# Patient Record
Sex: FEMALE | Race: WHITE | NOT HISPANIC OR LATINO | Employment: PART TIME | URBAN - METROPOLITAN AREA
[De-identification: names, ages, dates, MRNs, and addresses within clinical notes are randomized per-mention and may not be internally consistent; named-entity substitution may affect disease eponyms.]

---
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
## 2022-06-15 ENCOUNTER — HOSPITAL ENCOUNTER (EMERGENCY)
Facility: HOSPITAL | Age: 22
Discharge: HOME | End: 2022-06-16
Attending: EMERGENCY MEDICINE | Admitting: EMERGENCY MEDICINE
Payer: COMMERCIAL

## 2022-06-15 DIAGNOSIS — R07.89 OTHER CHEST PAIN: ICD-10-CM

## 2022-06-15 LAB
BASOPHILS # BLD AUTO: 0.1 10E3/UL (ref 0–0.2)
BASOPHILS NFR BLD AUTO: 1 %
EOSINOPHIL # BLD AUTO: 0.1 10E3/UL (ref 0–0.7)
EOSINOPHIL NFR BLD AUTO: 1 %
ERYTHROCYTE [DISTWIDTH] IN BLOOD BY AUTOMATED COUNT: 13.1 % (ref 10–15)
HCT VFR BLD AUTO: 37.6 % (ref 35–47)
HGB BLD-MCNC: 12 G/DL (ref 11.7–15.7)
IMM GRANULOCYTES # BLD: 0 10E3/UL
IMM GRANULOCYTES NFR BLD: 0 %
LYMPHOCYTES # BLD AUTO: 3.5 10E3/UL (ref 0.8–5.3)
LYMPHOCYTES NFR BLD AUTO: 36 %
MCH RBC QN AUTO: 26.4 PG (ref 26.5–33)
MCHC RBC AUTO-ENTMCNC: 31.9 G/DL (ref 31.5–36.5)
MCV RBC AUTO: 83 FL (ref 78–100)
MONOCYTES # BLD AUTO: 0.4 10E3/UL (ref 0–1.3)
MONOCYTES NFR BLD AUTO: 4 %
NEUTROPHILS # BLD AUTO: 5.8 10E3/UL (ref 1.6–8.3)
NEUTROPHILS NFR BLD AUTO: 58 %
NRBC # BLD AUTO: 0 10E3/UL
NRBC BLD AUTO-RTO: 0 /100
PLATELET # BLD AUTO: 264 10E3/UL (ref 150–450)
RBC # BLD AUTO: 4.54 10E6/UL (ref 3.8–5.2)
WBC # BLD AUTO: 9.9 10E3/UL (ref 4–11)

## 2022-06-15 PROCEDURE — 84484 ASSAY OF TROPONIN QUANT: CPT | Performed by: EMERGENCY MEDICINE

## 2022-06-15 PROCEDURE — 250N000013 HC RX MED GY IP 250 OP 250 PS 637: Performed by: EMERGENCY MEDICINE

## 2022-06-15 PROCEDURE — 99285 EMERGENCY DEPT VISIT HI MDM: CPT | Mod: 25

## 2022-06-15 PROCEDURE — 81025 URINE PREGNANCY TEST: CPT | Performed by: EMERGENCY MEDICINE

## 2022-06-15 PROCEDURE — 93005 ELECTROCARDIOGRAM TRACING: CPT | Performed by: EMERGENCY MEDICINE

## 2022-06-15 PROCEDURE — 80048 BASIC METABOLIC PNL TOTAL CA: CPT | Performed by: EMERGENCY MEDICINE

## 2022-06-15 PROCEDURE — 85025 COMPLETE CBC W/AUTO DIFF WBC: CPT | Performed by: EMERGENCY MEDICINE

## 2022-06-15 PROCEDURE — 36415 COLL VENOUS BLD VENIPUNCTURE: CPT | Performed by: EMERGENCY MEDICINE

## 2022-06-15 PROCEDURE — 85379 FIBRIN DEGRADATION QUANT: CPT | Performed by: EMERGENCY MEDICINE

## 2022-06-15 RX ORDER — IBUPROFEN 600 MG/1
600 TABLET, FILM COATED ORAL ONCE
Status: COMPLETED | OUTPATIENT
Start: 2022-06-15 | End: 2022-06-15

## 2022-06-15 RX ADMIN — IBUPROFEN 600 MG: 600 TABLET, FILM COATED ORAL at 23:34

## 2022-06-15 ASSESSMENT — ENCOUNTER SYMPTOMS
COUGH: 0
FEVER: 0
SHORTNESS OF BREATH: 0
CHILLS: 0

## 2022-06-16 ENCOUNTER — APPOINTMENT (OUTPATIENT)
Dept: RADIOLOGY | Facility: HOSPITAL | Age: 22
End: 2022-06-16
Attending: EMERGENCY MEDICINE
Payer: COMMERCIAL

## 2022-06-16 VITALS
HEIGHT: 62 IN | SYSTOLIC BLOOD PRESSURE: 116 MMHG | HEART RATE: 69 BPM | DIASTOLIC BLOOD PRESSURE: 57 MMHG | TEMPERATURE: 97.8 F | RESPIRATION RATE: 24 BRPM | WEIGHT: 143 LBS | BODY MASS INDEX: 26.31 KG/M2 | OXYGEN SATURATION: 99 %

## 2022-06-16 LAB
ANION GAP SERPL CALCULATED.3IONS-SCNC: 9 MMOL/L (ref 5–18)
BUN SERPL-MCNC: 8 MG/DL (ref 8–22)
CALCIUM SERPL-MCNC: 9.7 MG/DL (ref 8.5–10.5)
CHLORIDE BLD-SCNC: 108 MMOL/L (ref 98–107)
CO2 SERPL-SCNC: 24 MMOL/L (ref 22–31)
CREAT SERPL-MCNC: 0.69 MG/DL (ref 0.6–1.1)
D DIMER PPP FEU-MCNC: 0.38 UG/ML FEU (ref 0–0.5)
GFR SERPL CREATININE-BSD FRML MDRD: >90 ML/MIN/1.73M2
GLUCOSE BLD-MCNC: 144 MG/DL (ref 70–125)
HCG UR QL: NEGATIVE
Lab: NORMAL
POTASSIUM BLD-SCNC: 3.6 MMOL/L (ref 3.5–5)
SODIUM SERPL-SCNC: 141 MMOL/L (ref 136–145)
TROPONIN I SERPL-MCNC: 0.13 NG/ML (ref 0–0.29)

## 2022-06-16 PROCEDURE — 71045 X-RAY EXAM CHEST 1 VIEW: CPT

## 2022-06-16 NOTE — ED PROVIDER NOTES
EMERGENCY DEPARTMENT ENCOUNTER      NAME: Karime Aguilera  AGE: 22 year old female  YOB: 2000  MRN: [id number]  EVALUATION DATE & TIME: 6/15/2022 11:09 PM    PCP: Wendi Marin    ED PROVIDER: Leonarda Lagos M.D.      Chief Complaint   Patient presents with     Chest Pain         FINAL IMPRESSION:  1. Other chest pain        MEDICAL DECISION MAKING:  Pertinent Labs & Imaging studies reviewed. (See chart for details)  ED Course as of 06/16/22 0140   Wed [place] 15, 2022   2323 Afebrile.  Vital signs here unremarkable.  Patient is coming in for evaluation of chest pain.  Midsternal, radiates slightly to the right.  Worse with palpation.  Worse with deep breaths or laying down.  No fevers chills.  No risk factors for clotting.  She is low risk by [place] however she is not PERC negative as she does smoke.  No recent trips or travel.  No early cardiac history in family.  She has no significant medical history.  Reports menstrual cycle 2 weeks ago.    EKG here shows sinus rhythm with short VT at 108.  There is no ST elevations or depressions.  No ectopy.  Intervals are intact.  Normal axis.  No previous EKGs for comparison.  , QRS 84, .    Exam for patient here remarkable only for reproducible chest wall tenderness palpation just to the right of her sternum rib space 3 and 4.    Check labs for patient here, do a D-dimer as she does smoke, has mild risk factors.  Chest x-ray or CT as per D-dimer result.  EKG here is nonischemic.   Thu Jun 16, 2022   0043 Patient's work-up here is unremarkable.  Pain is better after ibuprofen.           Critical care: 0 minutes excluding separately billable procedures.  Includes bedside management, time reviewing test results, review of records, discussing the case with staff, documenting the medical record and time spent with family members (or surrogate decision makers) discussing specific treatment issues.      ED Course:  11:15 PM I met with the patient,  obtained history, performed an initial exam, and discussed options and plan for diagnostics and treatment here in the ED.     The importance of close follow up was discussed. We reviewed warning signs and symptoms, and I instructed Ms. Aguilera to return to the emergency department immediately if she develops any new or worsening symptoms. I provided additional verbal discharge instructions. Ms. Aguilera expressed understanding and agreement with this plan of care, her questions were answered, and she was discharged in stable condition.     MEDICATIONS GIVEN IN THE EMERGENCY:  Medications   ibuprofen (ADVIL/MOTRIN) tablet 600 mg (600 mg Oral Given 6/15/22 3761)       NEW PRESCRIPTIONS STARTED AT TODAY'S ER VISIT:  There are no discharge medications for this patient.         =================================================================    HPI    Patient information was obtained from: Patient    Use of : N/A         Karime Aguilera is a 22 year old female who presents with chest pain.    The patient reports chest pain that started earlier this evening.  She reports the pain initially was sternal, however the pain is now more right-sided and provoked by touching the area and deep breathing.  She reports the pain is sharp in nature.  She denies any recent long travel.  No birth control or blood thinner use.  She is a smoker.  She denies any falls or trauma.  She denies any fevers, chills, cough, shortness of breath, or other complaints at this time.       REVIEW OF SYSTEMS   Review of Systems   Constitutional: Negative for chills and fever.   Respiratory: Negative for cough and shortness of breath.    Cardiovascular: Positive for chest pain (sharp). Negative for leg swelling.   All other systems reviewed and are negative.    PAST MEDICAL HISTORY:  No past medical history on file.    PAST SURGICAL HISTORY:  No past surgical history on file.    CURRENT MEDICATIONS:    No current facility-administered medications for  "this encounter.  No current outpatient medications on file.    ALLERGIES:  No Known Allergies    FAMILY HISTORY:  No family history on file.    SOCIAL HISTORY:   Social History     Socioeconomic History     Marital status: Single       PHYSICAL EXAM:    Vitals: /57   Pulse 69   Temp 97.8  F (36.6  C) (Tympanic)   Resp 24   Ht 1.575 m (5' 2\")   Wt 64.9 kg (143 lb)   SpO2 99%   BMI 26.16 kg/m     General:. Alert and interactive, comfortable appearing.  HENT: Oropharynx without erythema or exudates. MMM.  TMs clear bilaterally.  Eyes: Pupils mid-sized and equally reactive.   Neck: Full AROM.  No midline tenderness to palpation.  Cardiovascular: Regular rate and rhythm. Peripheral pulses 2+ bilaterally.  Chest/Pulmonary: Normal work of breathing. Lung sounds clear and equal throughout, no wheezes or crackles. Reproducible chest wall tenderness palpation just to the right of her sternum rib space 3 and 4.  Abdomen: Soft, nondistended. Nontender without guarding or rebound.  Back/Spine: No CVA or midline tenderness.  Extremities: Normal ROM of all major joints. No lower extremity edema.   Skin: Warm and dry. Normal skin color.   Neuro: Speech clear. CNs grossly intact. Moves all extremities appropriately. Strength and sensation grossly intact to all extremities.   Psych: Normal affect/mood, cooperative, memory appropriate.     LAB:  All pertinent labs reviewed and interpreted.  Labs Ordered and Resulted from Time of ED Arrival to Time of ED Departure   BASIC METABOLIC PANEL - Abnormal       Result Value    Sodium 141      Potassium 3.6      Chloride 108 (*)     Carbon Dioxide (CO2) 24      Anion Gap 9      Urea Nitrogen 8      Creatinine 0.69      Calcium 9.7      Glucose 144 (*)     GFR Estimate >90     CBC WITH PLATELETS AND DIFFERENTIAL - Abnormal    WBC Count 9.9      RBC Count 4.54      Hemoglobin 12.0      Hematocrit 37.6      MCV 83      MCH 26.4 (*)     MCHC 31.9      RDW 13.1      Platelet Count 264  "     % Neutrophils 58      % Lymphocytes 36      % Monocytes 4      % Eosinophils 1      % Basophils 1      % Immature Granulocytes 0      NRBCs per 100 WBC 0      Absolute Neutrophils 5.8      Absolute Lymphocytes 3.5      Absolute Monocytes 0.4      Absolute Eosinophils 0.1      Absolute Basophils 0.1      Absolute Immature Granulocytes 0.0      Absolute NRBCs 0.0     TROPONIN I - Normal    Troponin I 0.13     D DIMER QUANTITATIVE - Normal    D-Dimer Quantitative 0.38     HCG QUALITATIVE URINE    hCG Urine Qualitative Negative      Exp Date hCG Urine           RADIOLOGY:  XR Chest Port 1 View   Final Result   IMPRESSION: Negative chest.          EKG:  See MDM    I, Suhail Jauregui, am serving as a scribe to document services personally performed by Dr. Leonarda Lagos  based on my observation and the provider's statements to me. I, Leonarda Lagos MD attest that Suhail Jauregui is acting in a scribe capacity, has observed my performance of the services and has documented them in accordance with my direction.      Leonarda Lagos M.D.  Emergency Medicine  [facility] EMERGENCY DEPARTMENT  [facility] [facility] [id number] [phone]  Dept: [phone]       Katt Lagos MD  06/16/22 0140

## 2022-06-16 NOTE — DISCHARGE INSTRUCTIONS
Thankfully your work-up here is unremarkable.  It is likely some irritation between your ribs called costochondritis.  You may take ibuprofen for this discomfort.  Please return for any new or worsening symptoms.

## 2022-06-16 NOTE — ED TRIAGE NOTES
Pt c/o chest pain that started this morning. C/o midsternal sharp chest pain, per pt it is painful with breathing. No N/V.   Triage Assessment     Row Name 06/15/22 7696       Triage Assessment (Adult)    Airway WDL WDL       Respiratory WDL    Respiratory WDL WDL       Skin Circulation/Temperature WDL    Skin Circulation/Temperature WDL WDL       Cognitive/Neuro/Behavioral WDL    Cognitive/Neuro/Behavioral WDL WDL

## 2022-06-19 LAB
ATRIAL RATE - MUSE: 62 BPM
DIASTOLIC BLOOD PRESSURE - MUSE: 60 MMHG
INTERPRETATION ECG - MUSE: NORMAL
P AXIS - MUSE: 39 DEGREES
PR INTERVAL - MUSE: 108 MS
QRS DURATION - MUSE: 84 MS
QT - MUSE: 400 MS
QTC - MUSE: 406 MS
R AXIS - MUSE: 79 DEGREES
SYSTOLIC BLOOD PRESSURE - MUSE: 122 MMHG
T AXIS - MUSE: 21 DEGREES
VENTRICULAR RATE- MUSE: 62 BPM

## 2024-07-28 ENCOUNTER — APPOINTMENT (OUTPATIENT)
Dept: CT IMAGING | Facility: HOSPITAL | Age: 24
End: 2024-07-28
Attending: EMERGENCY MEDICINE

## 2024-07-28 ENCOUNTER — HOSPITAL ENCOUNTER (EMERGENCY)
Facility: HOSPITAL | Age: 24
Discharge: HOME | End: 2024-07-28
Attending: EMERGENCY MEDICINE | Admitting: EMERGENCY MEDICINE

## 2024-07-28 ENCOUNTER — APPOINTMENT (OUTPATIENT)
Dept: RADIOLOGY | Facility: HOSPITAL | Age: 24
End: 2024-07-28
Attending: EMERGENCY MEDICINE

## 2024-07-28 VITALS
OXYGEN SATURATION: 100 % | HEART RATE: 75 BPM | WEIGHT: 152 LBS | TEMPERATURE: 97.7 F | RESPIRATION RATE: 16 BRPM | DIASTOLIC BLOOD PRESSURE: 76 MMHG | SYSTOLIC BLOOD PRESSURE: 139 MMHG | BODY MASS INDEX: 27.97 KG/M2 | HEIGHT: 62 IN
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
## 2024-07-28 DIAGNOSIS — M54.50 ACUTE MIDLINE LOW BACK PAIN WITHOUT SCIATICA: ICD-10-CM

## 2024-07-28 PROCEDURE — 99284 EMERGENCY DEPT VISIT MOD MDM: CPT | Mod: 25

## 2024-07-28 PROCEDURE — 70450 CT HEAD/BRAIN W/O DYE: CPT

## 2024-07-28 PROCEDURE — 70486 CT MAXILLOFACIAL W/O DYE: CPT

## 2024-07-28 PROCEDURE — 72100 X-RAY EXAM L-S SPINE 2/3 VWS: CPT

## 2024-07-28 PROCEDURE — 72125 CT NECK SPINE W/O DYE: CPT

## 2024-07-28 ASSESSMENT — COLUMBIA-SUICIDE SEVERITY RATING SCALE - C-SSRS
2. HAVE YOU ACTUALLY HAD ANY THOUGHTS OF KILLING YOURSELF IN THE PAST MONTH?: NO
1. IN THE PAST MONTH, HAVE YOU WISHED YOU WERE DEAD OR WISHED YOU COULD GO TO SLEEP AND NOT WAKE UP?: NO
6. HAVE YOU EVER DONE ANYTHING, STARTED TO DO ANYTHING, OR PREPARED TO DO ANYTHING TO END YOUR LIFE?: NO

## 2024-07-28 ASSESSMENT — ACTIVITIES OF DAILY LIVING (ADL)
ADLS_ACUITY_SCORE: 33
ADLS_ACUITY_SCORE: 33
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
## 2024-07-28 NOTE — DISCHARGE INSTRUCTIONS
Fortunately no signs of significant injuries were identified on your tests today.  Follow-up closely with your primary care doctor and return to the ER for any worsening symptoms or other concerns.

## 2025-06-20 ENCOUNTER — HOSPITAL ENCOUNTER (EMERGENCY)
Facility: HOSPITAL | Age: 25
Discharge: HOME | End: 2025-06-20
Attending: EMERGENCY MEDICINE | Admitting: EMERGENCY MEDICINE
Payer: COMMERCIAL

## 2025-06-20 VITALS
BODY MASS INDEX: 30.55 KG/M2 | WEIGHT: 166 LBS | HEART RATE: 70 BPM | HEIGHT: 62 IN | RESPIRATION RATE: 16 BRPM | OXYGEN SATURATION: 99 % | TEMPERATURE: 98.6 F | SYSTOLIC BLOOD PRESSURE: 140 MMHG | DIASTOLIC BLOOD PRESSURE: 94 MMHG

## 2025-06-20 DIAGNOSIS — N93.9 VAGINAL BLEEDING: ICD-10-CM

## 2025-06-20 LAB
ABO + RH BLD: NORMAL
APTT PPP: 24 SECONDS (ref 22–38)
BASOPHILS # BLD AUTO: 0 10E3/UL (ref 0–0.2)
BASOPHILS NFR BLD AUTO: 0 %
BLD GP AB SCN SERPL QL: NEGATIVE
EOSINOPHIL # BLD AUTO: 0 10E3/UL (ref 0–0.7)
EOSINOPHIL NFR BLD AUTO: 0 %
ERYTHROCYTE [DISTWIDTH] IN BLOOD BY AUTOMATED COUNT: 13.2 % (ref 10–15)
HCG SERPL QL: NEGATIVE
HCT VFR BLD AUTO: 42.2 % (ref 35–47)
HGB BLD-MCNC: 13.3 G/DL (ref 11.7–15.7)
HOLD SPECIMEN: NORMAL
IMM GRANULOCYTES # BLD: 0 10E3/UL
IMM GRANULOCYTES NFR BLD: 0 %
INR PPP: 0.89 (ref 0.85–1.15)
LYMPHOCYTES # BLD AUTO: 2.2 10E3/UL (ref 0.8–5.3)
LYMPHOCYTES NFR BLD AUTO: 26 %
MCH RBC QN AUTO: 25.8 PG (ref 26.5–33)
MCHC RBC AUTO-ENTMCNC: 31.5 G/DL (ref 31.5–36.5)
MCV RBC AUTO: 82 FL (ref 78–100)
MONOCYTES # BLD AUTO: 0.3 10E3/UL (ref 0–1.3)
MONOCYTES NFR BLD AUTO: 3 %
NEUTROPHILS # BLD AUTO: 5.9 10E3/UL (ref 1.6–8.3)
NEUTROPHILS NFR BLD AUTO: 70 %
NRBC # BLD AUTO: 0 10E3/UL
NRBC BLD AUTO-RTO: 0 /100
PLATELET # BLD AUTO: 258 10E3/UL (ref 150–450)
PROTHROMBIN TIME: 12.3 SECONDS (ref 11.8–14.8)
RBC # BLD AUTO: 5.15 10E6/UL (ref 3.8–5.2)
SPECIMEN EXP DATE BLD: NORMAL
WBC # BLD AUTO: 8.4 10E3/UL (ref 4–11)

## 2025-06-20 PROCEDURE — 85004 AUTOMATED DIFF WBC COUNT: CPT | Performed by: EMERGENCY MEDICINE

## 2025-06-20 PROCEDURE — 85610 PROTHROMBIN TIME: CPT | Performed by: EMERGENCY MEDICINE

## 2025-06-20 PROCEDURE — 86900 BLOOD TYPING SEROLOGIC ABO: CPT | Performed by: EMERGENCY MEDICINE

## 2025-06-20 PROCEDURE — 84703 CHORIONIC GONADOTROPIN ASSAY: CPT | Performed by: EMERGENCY MEDICINE

## 2025-06-20 PROCEDURE — 36415 COLL VENOUS BLD VENIPUNCTURE: CPT | Performed by: EMERGENCY MEDICINE

## 2025-06-20 PROCEDURE — 85730 THROMBOPLASTIN TIME PARTIAL: CPT | Performed by: EMERGENCY MEDICINE

## 2025-06-20 PROCEDURE — 99283 EMERGENCY DEPT VISIT LOW MDM: CPT | Performed by: EMERGENCY MEDICINE

## 2025-06-20 NOTE — ED PROVIDER NOTES
EMERGENCY DEPARTMENT ENCOUNTER     NAME: Karime Aguilera   AGE: 25 year old female   YOB: 2000   MRN: [id number]   EVALUATION DATE & TIME: No admission date for patient encounter.   PCP: Wendi Marin     Chief Complaint   Patient presents with    Vaginal Bleeding   :    FINAL IMPRESSION       1. Vaginal bleeding           ED COURSE & MEDICAL DECISION MAKING      Pertinent Labs & Imaging studies reviewed. (See chart for details)   25 year old female  presents to the Emergency Department for evaluation of ongoing vaginal bleeding.  Patient started new OCPs and then started spotting and has now been lightly bleeding with intermittent cramping and was worried about the continuation. Initial Vitals Reviewed. Initial exam notable for well-appearing patient with normal vitals, no abdominal tenderness.  She does not complain of any signs or symptoms of anemia, but I think her biggest concern was the duration of bleeding.  It sounds like prior to starting these OCPs her menstrual periods would last for about 10 days and I reviewed some OB records that show they think she has endometrial polyps and is scheduled for hysteroscopy.  The likely scenario is that this is just atypical vaginal bleeding following the initiation of hormonal OCP therapy and can last for several weeks.  I did check an hCG which is negative and check the hemoglobin which is actually better than her baseline at 13.  I do not suspect acute blood loss concerns and patient feels very reassured and is comfortable with discharge and her already scheduled OB follow-up.    12:34 PM Met with patient for initial interview.                At the conclusion of the encounter I discussed the results of all of the tests and the disposition. The questions were answered. The patient or family acknowledged understanding and was agreeable with the care plan.     0 minutes critical care time, see procedure note below for details if relevant    Medical  Decision Making  I reviewed the EMR: Outpatient Record: OB/GYN nurse triage note 6/16/2025  Discharge. I recommended the patient continue their current prescription strength medication(s): ocps. I considered admission, but ultimately discharged patient with reassuring evaluation and lab workup.    MIPS (CTPE, Dental pain, Miranda, Sinusitis, Asthma/COPD, Head Trauma): Not Applicable    SEPSIS: None                      MEDICATIONS GIVEN IN THE EMERGENCY:   Medications - No data to display   NEW PRESCRIPTIONS STARTED AT TODAY'S ER VISIT   New Prescriptions    No medications on file     ================================================================   HISTORY OF PRESENT ILLNESS       Patient information was obtained from: The patient   Use of Intrepreter: N/A   Karime Aguilera is a 25 year old female with history of elevated troponin who presents via private vehicle for evaluation of vaginal bleeding.    Patient presents to ED for evaluation of vaginal bleeding. Patient reports heavy and constant vaginal bleeding since she began taking birth control ~ 6 weeks ago. Patient also states she's experiencing persistent period cramps. She endorses taking pain medicine with little relief.     Per chart review, patient had a telephone encounter on 6/16/2025 with women's health consultants. Patient reports she began spotting on June 9th which developed into light bleeding. Patient was started on drospirenone-ethinyl estradioL 3-0.02 mg tablet on 5/17/2025. Patient advised that irregular bleeding is common when starting a new birth control. Advised patient to take ibuprofen/tylenol for the pain.     Per chart review, patient was seen on 6/11/2025 for evaluation of endometrial polyps. Patient underwent a hysteroscopy US on 5/14/2025 which showed the uterus is largely normal in appearance. There is a 0.7 cm intrauterine mass, consistent with an endometrial polyp. Consider saline infusion sonogram versus hysteroscopic evaluation.  The  right ovary appears polycystic. The left ovary appears polycystic. We reviewed ultrasound images in detail. Discussed etiology for AUB including structural causes including fibroid and polyp. Reviewed US images consistent with polyp.  Recommend hysteroscopy, D&C, possible polypectomy.Also discussed R/B/A of procedure. Patient discharged on 6/11/2025 in good condition.      ================================================================        PAST HISTORY     PAST MEDICAL HISTORY:   No past medical history on file.   PAST SURGICAL HISTORY:   No past surgical history on file.   CURRENT MEDICATIONS:   No current outpatient medications on file.    ALLERGIES:   No Known Allergies   FAMILY HISTORY:   No family history on file.   SOCIAL HISTORY:   Social History     Socioeconomic History    Marital status: Single     Social Drivers of Health     Financial Resource Strain: Low Risk  (5/30/2025)    Received from [employer]    Financial Resource Strain     Difficulty of Paying Living Expenses: 3   Food Insecurity: No Food Insecurity (5/30/2025)    Received from [employer]    Food Insecurity     Do you worry your food will run out before you are able to buy more?: 1   Transportation Needs: No Transportation Needs (5/30/2025)    Received from [employer]    Transportation Needs     Does lack of transportation keep you from medical appointments?: 1     Does lack of transportation keep you from work, meetings or getting things that you need?: 1   Social Connections: Socially Integrated (5/30/2025)    Received from [employer]    Social Connections     Do you often feel lonely or isolated from those around you?: 0   Housing Stability: Low Risk  (5/30/2025)    Received from [employer]    Housing Stability     What is your housing situation today?: 1     "    VITALS  Patient Vitals for the past 24 hrs:   BP Temp Temp src Pulse Resp SpO2 Height Weight   06/20/25 1202 (!) 140/94 98.6  F (37  C) Oral 70 16 99 % 1.575 m (5' 2\") 75.3 kg (166 lb)        ================================================================    PHYSICAL EXAM     VITAL SIGNS: BP (!) 140/94   Pulse 70   Temp 98.6  F (37  C) (Oral)   Resp 16   Ht 1.575 m (5' 2\")   Wt 75.3 kg (166 lb)   SpO2 99%   BMI 30.36 kg/m     Constitutional:  Awake, no acute distress   HENT:  Atraumatic, oropharynx without exudate or erythema, membranes moist  Lymph:  No adenopathy  Eyes: EOM intact, PERRL, no injection  Neck: Supple  Respiratory:  Clear to auscultation bilaterally, no wheezes or crackles   Cardiovascular:  Regular rate and rhythm, single S1 and S2   GI:  Soft, nontender, nondistended, no rebound or guarding   Musculoskeletal:  Moves all extremities, no lower extremity edema, no deformities    Skin:  Warm, dry  Neurologic:  Alert and oriented x3, no focal deficits noted       ================================================================  LAB       All pertinent labs reviewed and interpreted.   Labs Ordered and Resulted from Time of ED Arrival to Time of ED Departure   CBC WITH PLATELETS AND DIFFERENTIAL - Abnormal       Result Value    WBC Count 8.4      RBC Count 5.15      Hemoglobin 13.3      Hematocrit 42.2      MCV 82      MCH 25.8 (*)     MCHC 31.5      RDW 13.2      Platelet Count 258      % Neutrophils 70      % Lymphocytes 26      % Monocytes 3      % Eosinophils 0      % Basophils 0      % Immature Granulocytes 0      NRBCs per 100 WBC 0      Absolute Neutrophils 5.9      Absolute Lymphocytes 2.2      Absolute Monocytes 0.3      Absolute Eosinophils 0.0      Absolute Basophils 0.0      Absolute Immature Granulocytes 0.0      Absolute NRBCs 0.0     HCG QUALITATIVE PREGNANCY - Normal    hCG Serum Qualitative Negative     INR - Normal    INR 0.89      PT 12.3     PARTIAL THROMBOPLASTIN TIME - " Normal    aPTT 24     TYPE AND SCREEN, ADULT    SPECIMEN EXPIRATION DATE 6/23/2025 11:59:00 PM CDT     ABO/RH TYPE AND SCREEN        ===============================================================  RADIOLOGY       Reviewed all pertinent imaging. Please see official radiology report.   No orders to display         ================================================================  EKG         I have independently reviewed and interpreted the EKG(s) documented above.     ================================================================  PROCEDURES         I, , am serving as a scribe to document services personally performed by Dr. Ibrahim based on my observation and the provider's statements to me. I, Angy Ibrahim MD attest that  is acting in a scribe capacity, has observed my performance of the services and has documented them in accordance with my direction.   Angy Ibrahim M.D.   Emergency Medicine   [facility] EMERGENCY DEPARTMENT  [address] [place] [id number]  [phone]  Dept: [phone]       Angy Ibrahim MD  06/20/25 [id number]

## 2025-06-20 NOTE — DISCHARGE INSTRUCTIONS
Fortunately, the blood counts are normal.  There are no signs of any blood loss or pregnancy.  This can be very normal when starting a new birth control and can sometimes take a few weeks to stabilize.

## 2025-06-20 NOTE — ED TRIAGE NOTES
Patient presents here for evaluation of heavy and prolonged vaginal bleeding and cramping. She began taking oral birth control [place] about 6 weeks ago. She reports feeling very fatigued. She also notes several clots in the passed blood.      Triage Assessment (Adult)       Row Name 06/20/25 1205          Triage Assessment    Airway WDL WDL        Respiratory WDL    Respiratory WDL WDL        Skin Circulation/Temperature WDL    Skin Circulation/Temperature WDL WDL        Cardiac WDL    Cardiac WDL WDL        Peripheral/Neurovascular WDL    Peripheral Neurovascular WDL WDL        Cognitive/Neuro/Behavioral WDL    Cognitive/Neuro/Behavioral WDL WDL